# Patient Record
Sex: FEMALE | Race: OTHER | URBAN - METROPOLITAN AREA
[De-identification: names, ages, dates, MRNs, and addresses within clinical notes are randomized per-mention and may not be internally consistent; named-entity substitution may affect disease eponyms.]

---

## 2017-11-25 ENCOUNTER — EMERGENCY (EMERGENCY)
Facility: HOSPITAL | Age: 32
LOS: 1 days | Discharge: HOME | End: 2017-11-25
Attending: EMERGENCY MEDICINE | Admitting: EMERGENCY MEDICINE
Payer: COMMERCIAL

## 2017-11-25 VITALS
RESPIRATION RATE: 15 BRPM | WEIGHT: 119.93 LBS | HEART RATE: 97 BPM | HEIGHT: 62 IN | SYSTOLIC BLOOD PRESSURE: 122 MMHG | TEMPERATURE: 98 F | OXYGEN SATURATION: 98 % | DIASTOLIC BLOOD PRESSURE: 80 MMHG
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
## 2017-11-25 DIAGNOSIS — M79.672 PAIN IN LEFT FOOT: ICD-10-CM
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
## 2017-11-25 DIAGNOSIS — S99.922A UNSPECIFIED INJURY OF LEFT FOOT, INITIAL ENCOUNTER: ICD-10-CM

## 2017-11-25 PROCEDURE — 99284 EMERGENCY DEPT VISIT MOD MDM: CPT

## 2017-11-25 PROCEDURE — 99283 EMERGENCY DEPT VISIT LOW MDM: CPT | Mod: 25

## 2017-11-25 PROCEDURE — 73630 X-RAY EXAM OF FOOT: CPT

## 2017-11-25 PROCEDURE — 73630 X-RAY EXAM OF FOOT: CPT | Mod: 26,LT
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
## 2017-11-25 NOTE — ED PROVIDER NOTE - PROGRESS NOTE DETAILS
Discussed with Patient and/or Family regarding the X-ray findings.  Discussed the risks of occult / secondary fracture or ligamentous/tendon injury. Discussed crush inj prec / inst as well. Discussed the importance of close prompt follow-up with Orthopaedics for definitive workup and treatment.  Also discussed injury / neuro precautions.  Verbalization of understanding of all instructions was shown and an opportunity was given for questions.
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
## 2018-07-23 NOTE — ED PROVIDER NOTE - CHPI ED SYMPTOMS NEG
no numbness/no abrasion/no back pain/no tingling/no deformity/no fever/no difficulty bearing weight/no stiffness
Adequate: hears normal conversation without difficulty

## 2021-07-21 ENCOUNTER — EMERGENCY (EMERGENCY)
Facility: HOSPITAL | Age: 36
LOS: 1 days | Discharge: OTHER INSTITUTION | End: 2021-07-21
Attending: INTERNAL MEDICINE | Admitting: INTERNAL MEDICINE
Payer: MEDICAID

## 2021-07-21 VITALS
TEMPERATURE: 98 F | SYSTOLIC BLOOD PRESSURE: 128 MMHG | OXYGEN SATURATION: 98 % | DIASTOLIC BLOOD PRESSURE: 86 MMHG | RESPIRATION RATE: 16 BRPM | HEIGHT: 62 IN | HEART RATE: 81 BPM | WEIGHT: 134.92 LBS

## 2021-07-21 PROCEDURE — 99285 EMERGENCY DEPT VISIT HI MDM: CPT

## 2021-07-21 NOTE — ED PROVIDER NOTE - PATIENT PORTAL LINK FT
You can access the FollowMyHealth Patient Portal offered by [facility] by registering at the following website: [email or web address]. By joining [employer]’s FollowMyHealth portal, you will also be able to view your health information using other applications (apps) compatible with our system.

## 2021-07-21 NOTE — ED PROVIDER NOTE - OBJECTIVE STATEMENT
35 yr old female c/o LLQ and casey back pain radiating to the vaginal area x 2 weeks. Denies hematuria, urinary burning, fever, NVD.  back pain general 35 yr old female c/o left flank pain ,  back pain radiating to the low abdomen  x 2 weeks. Her pain is increased this evening. No fever, no chills, no urinary symptoms, no nausea, no vomiting, not toxic in appearance

## 2021-07-21 NOTE — ED PROVIDER NOTE - CLINICAL SUMMARY MEDICAL DECISION MAKING FREE TEXT BOX
left flank pain  CT left distal stone cluster U/A reporting  UTI  plan ....  Rx IVF IV Rocephin fredy-max analgesia, call out to Dr Biggs for disposition

## 2021-07-21 NOTE — ED ADULT TRIAGE NOTE - CHIEF COMPLAINT QUOTE
35 yr old female c/o LLQ and casey back pain radiating to the vaginal area x 2 weeks. Denies hematuria, urinary burning, fever, NVD.

## 2021-07-21 NOTE — ED PROVIDER NOTE - SIGNIFICANT NEGATIVE FINDINGS
no headache, no chest pain, no SOB, no palpitations, no n/v  no urinary symptoms,  no neuro changes.

## 2021-07-21 NOTE — ED PROVIDER NOTE - NSFOLLOWUPINSTRUCTIONS_ED_ALL_ED_FT
1) Follow-up with your Primary Medical Doctor or referred doctor. Call today / next business day for prompt follow-up.  2) Return to Emergency room for any worsening or persistent pain, weakness,  vomiting, diarrhea, unable to eat / drink, weak or dizzy, or any other concerning symptoms.   -If you have ANY FEVER, you must return to ED immediately  3) See attached instruction sheets for additional information, including information regarding signs and symptoms to look out for, reasons to seek immediate care and other important instructions.  4) Follow-up with Urology, see attached. Call ASAP for appointment.  5) Percocet as needed for pain, no driving / operating heavy machinery

## 2021-07-21 NOTE — ED PROVIDER NOTE - PROGRESS NOTE DETAILS
DW Dr Biggs  appears to be stone cluster with UTI. The patient is not experiencing fever, If the pain is controlled and she is not experiencing fever or chills, then may treat out patient. Recommend observing the patient in the ED. plan additional IVF and Flomax DW Dr Biggs  appears to be stone cluster with UTI. The patient is not experiencing fever, If the pain is controlled and she is not experiencing fever or chills, then may treat out patient. Recommend observing the patient in the ED. plan additional IVF and Flomax  Also if the lead stone passes than the of stones should pass as well.

## 2021-07-21 NOTE — ED PROVIDER NOTE - CROS ED GI ALL NEG
Discharge Instructions    Discharged to home by car with relative. Discharged via walking, hospital escort: Yes.  Special equipment needed: Not Applicable    Be sure to schedule a follow-up appointment with your primary care doctor or any specialists as instructed.     Discharge Plan:   Influenza Vaccine Indication: Not indicated: Previously immunized this influenza season and > 8 years of age    I understand that a diet low in cholesterol, fat, and sodium is recommended for good health. Unless I have been given specific instructions below for another diet, I accept this instruction as my diet prescription.   Other diet:     Special Instructions: None    · Is patient discharged on Warfarin / Coumadin?   Yes    You are receiving the drug warfarin. Please understand the importance of monitoring warfarin with scheduled PT/INR blood draws.  Follow-up with a call to your personal Doctor's office in 3 days to schedule a PT/INR. .    IMPORTANT: HOW TO USE THIS INFORMATION:  This is a summary and does NOT have all possible information about this product. This information does not assure that this product is safe, effective, or appropriate for you. This information is not individual medical advice and does not substitute for the advice of your health care professional. Always ask your health care professional for complete information about this product and your specific health needs.      WARFARIN - ORAL (WARF-uh-rin)      COMMON BRAND NAME(S): Coumadin      WARNING:  Warfarin can cause very serious (possibly fatal) bleeding. This is more likely to occur when you first start taking this medication or if you take too much warfarin. To decrease your risk for bleeding, your doctor or other health care provider will monitor you closely and check your lab results (INR test) to make sure you are not taking too much warfarin. Keep all medical and laboratory appointments. Tell your doctor right away if you notice any signs of  "serious bleeding. See also Side Effects section.      USES:  This medication is used to treat blood clots (such as in deep vein thrombosis-DVT or pulmonary embolus-PE) and/or to prevent new clots from forming in your body. Preventing harmful blood clots helps to reduce the risk of a stroke or heart attack. Conditions that increase your risk of developing blood clots include a certain type of irregular heart rhythm (atrial fibrillation), heart valve replacement, recent heart attack, and certain surgeries (such as hip/knee replacement). Warfarin is commonly called a \"blood thinner,\" but the more correct term is \"anticoagulant.\" It helps to keep blood flowing smoothly in your body by decreasing the amount of certain substances (clotting proteins) in your blood.      HOW TO USE:  Read the Medication Guide provided by your pharmacist before you start taking warfarin and each time you get a refill. If you have any questions, ask your doctor or pharmacist. Take this medication by mouth with or without food as directed by your doctor or other health care professional, usually once a day. It is very important to take it exactly as directed. Do not increase the dose, take it more frequently, or stop using it unless directed by your doctor. Dosage is based on your medical condition, laboratory tests (such as INR), and response to treatment. Your doctor or other health care provider will monitor you closely while you are taking this medication to determine the right dose for you. Use this medication regularly to get the most benefit from it. To help you remember, take it at the same time each day. It is important to eat a balanced, consistent diet while taking warfarin. Some foods can affect how warfarin works in your body and may affect your treatment and dose. Avoid sudden large increases or decreases in your intake of foods high in vitamin K (such as broccoli, cauliflower, cabbage, brussels sprouts, kale, spinach, and other " green leafy vegetables, liver, green tea, certain vitamin supplements). If you are trying to lose weight, check with your doctor before you try to go on a diet. Cranberry products may also affect how your warfarin works. Limit the amount of cranberry juice (16 ounces/480 milliliters a day) or other cranberry products you may drink or eat.      SIDE EFFECTS:  Nausea, loss of appetite, or stomach/abdominal pain may occur. If any of these effects persist or worsen, tell your doctor or pharmacist promptly. Remember that your doctor has prescribed this medication because he or she has judged that the benefit to you is greater than the risk of side effects. Many people using this medication do not have serious side effects. This medication can cause serious bleeding if it affects your blood clotting proteins too much (shown by unusually high INR lab results). Even if your doctor stops your medication, this risk of bleeding can continue for up to a week. Tell your doctor right away if you have any signs of serious bleeding, including: unusual pain/swelling/discomfort, unusual/easy bruising, prolonged bleeding from cuts or gums, persistent/frequent nosebleeds, unusually heavy/prolonged menstrual flow, pink/dark urine, coughing up blood, vomit that is bloody or looks like coffee grounds, severe headache, dizziness/fainting, unusual or persistent tiredness/weakness, bloody/black/tarry stools, chest pain, shortness of breath, difficulty swallowing. Tell your doctor right away if any of these unlikely but serious side effects occur: persistent nausea/vomiting, severe stomach/abdominal pain, yellowing eyes/skin. This drug rarely has caused very serious (possibly fatal) problems if its effects lead to small blood clots (usually at the beginning of treatment). This can lead to severe skin/tissue damage that may require surgery or amputation if left untreated. Patients with certain blood conditions (protein C or S deficiency) may  be at greater risk. Get medical help right away if any of these rare but serious side effects occur: painful/red/purplish patches on the skin (such as on the toe, breast, abdomen), change in the amount of urine, vision changes, confusion, slurred speech, weakness on one side of the body. A very serious allergic reaction to this drug is rare. However, get medical help right away if you notice any symptoms of a serious allergic reaction, including: rash, itching/swelling (especially of the face/tongue/throat), severe dizziness, trouble breathing. This is not a complete list of possible side effects. If you notice other effects not listed above, contact your doctor or pharmacist. In the [place] - Call your doctor for medical advice about side effects. You may report side effects to FDA at 9-530-AAY-1020. In [place] - Call your doctor for medical advice about side effects. You may report side effects to [facility] [place] at 1-523.391.9642.      PRECAUTIONS:  Before taking warfarin, tell your doctor or pharmacist if you are allergic to it; or if you have any other allergies. This product may contain inactive ingredients, which can cause allergic reactions or other problems. Talk to your pharmacist for more details. Before using this medication, tell your doctor or pharmacist your medical history, especially of: blood disorders (such as anemia, hemophilia), bleeding problems (such as bleeding of the stomach/intestines, bleeding in the brain), blood vessel disorders (such as aneurysms), recent major injury/surgery, liver disease, alcohol use, mental/mood disorders (including memory problems), frequent falls/injuries. It is important that all your doctors and dentists know that you take warfarin. Before having surgery or any medical/dental procedures, tell your doctor or dentist that you are taking this medication and about all the products you use (including prescription drugs, nonprescription drugs, and herbal products). Avoid  getting injections into the muscles. If you must have an injection into a muscle (for example, a flu shot), it should be given in the arm. This way, it will be easier to check for bleeding and/or apply pressure bandages. This medication may cause stomach bleeding. Daily use of alcohol while using this medicine will increase your risk for stomach bleeding and may also affect how this medication works. Limit or avoid alcoholic beverages. If you have not been eating well, if you have an illness or infection that causes fever, vomiting, or diarrhea for more than 2 days, or if you start using any antibiotic medications, contact your doctor or pharmacist immediately because these conditions can affect how warfarin works. This medication can cause heavy bleeding. To lower the chance of getting cut, bruised, or injured, use great caution with sharp objects like safety razors and nail cutters. Use an electric razor when shaving and a soft toothbrush when brushing your teeth. Avoid activities such as contact sports. If you fall or injure yourself, especially if you hit your head, call your doctor immediately. Your doctor may need to check you. The Food & Drug Administration has stated that generic warfarin products are interchangeable. However, consult your doctor or pharmacist before switching warfarin products. Be careful not to take more than one medication that contains warfarin unless specifically directed by the doctor or health care provider who is monitoring your warfarin treatment. Older adults may be at greater risk for bleeding while using this drug. This medication is not recommended for use during pregnancy because of serious (possibly fatal) harm to an unborn baby. Discuss the use of reliable forms of birth control with your doctor. If you become pregnant or think you may be pregnant, tell your doctor immediately. If you are planning pregnancy, discuss a plan for managing your condition with your doctor before  "you become pregnant. Your doctor may switch the type of medication you use during pregnancy. Very small amounts of this medication may pass into breast milk but is unlikely to harm a nursing infant. Consult your doctor before breast-feeding.      DRUG INTERACTIONS:  Drug interactions may change how your medications work or increase your risk for serious side effects. This document does not contain all possible drug interactions. Keep a list of all the products you use (including prescription/nonprescription drugs and herbal products) and share it with your doctor and pharmacist. Do not start, stop, or change the dosage of any medicines without your doctor's approval. Warfarin interacts with many prescription, nonprescription, vitamin, and herbal products. This includes medications that are applied to the skin or inside the vagina or rectum. The interactions with warfarin usually result in an increase or decrease in the \"blood-thinning\" (anticoagulant) effect. Your doctor or other health care professional should closely monitor you to prevent serious bleeding or clotting problems. While taking warfarin, it is very important to tell your doctor or pharmacist of any changes in medications, vitamins, or herbal products that you are taking. Some products that may interact with this drug include: capecitabine, imatinib, mifepristone. Aspirin, aspirin-like drugs (salicylates), and nonsteroidal anti-inflammatory drugs (NSAIDs such as ibuprofen, naproxen, celecoxib) may have effects similar to warfarin. These drugs may increase the risk of bleeding problems if taken during treatment with warfarin. Carefully check all prescription/nonprescription product labels (including drugs applied to the skin such as pain-relieving creams) since the products may contain NSAIDs or salicylates. Talk to your doctor about using a different medication (such as acetaminophen) to treat pain/fever. Low-dose aspirin and related drugs (such as " clopidogrel, ticlopidine) should be continued if prescribed by your doctor for specific medical reasons such as heart attack or stroke prevention. Consult your doctor or pharmacist for more details. Many herbal products interact with warfarin. Tell your doctor before taking any herbal products, especially bromelains, coenzyme Q10, cranberry, danshen, dong quai, fenugreek, garlic, ginkgo biloba, ginseng, and Harry's wort, among others. This medication may interfere with a certain laboratory test to measure theophylline levels, possibly causing false test results. Make sure laboratory personnel and all your doctors know you use this drug.      OVERDOSE:  If overdose is suspected, contact a poison control center or emergency room immediately. [place] residents can call the [place] National Poison Hotline at 1-774.318.7467. [place] residents can call a provincial poison control center. Symptoms of overdose may include: bloody/black/tarry stools, pink/dark urine, unusual/prolonged bleeding.      NOTES:  Do not share this medication with others. Laboratory and/or medical tests (such as INR, complete blood count) must be performed periodically to monitor your progress or check for side effects. Consult your doctor for more details.      MISSED DOSE:  For the best possible benefit, do not miss any doses. If you do miss a dose and remember on the same day, take it as soon as you remember. If you remember on the next day, skip the missed dose and resume your usual dosing schedule. Do not double the dose to catch up because this could increase your risk for bleeding. Keep a record of missed doses to give to your doctor or pharmacist. Contact your doctor or pharmacist if you miss 2 or more doses in a row.      STORAGE:  Store at room temperature away from light and moisture. Do not store in the bathroom. Keep all medications away from children and pets. Do not flush medications down the toilet or pour them into a drain unless  instructed to do so. Properly discard this product when it is  or no longer needed. Consult your pharmacist or local waste disposal company for more details about how to safely discard your product.      MEDICAL ALERT:  Your condition and medication can cause complications in a medical emergency. For information about enrolling in MedicAlert, call 1-956.416.5852 ([place]) or 1-246.468.5075 ([place]).      Information last revised 2010 Copyright(c)  [employer]             Depression / Suicide Risk    As you are discharged from this [facility] facility, it is important to learn how to keep safe from harming yourself.    Recognize the warning signs:  · Abrupt changes in personality, positive or negative- including increase in energy   · Giving away possessions  · Change in eating patterns- significant weight changes-  positive or negative  · Change in sleeping patterns- unable to sleep or sleeping all the time   · Unwillingness or inability to communicate  · Depression  · Unusual sadness, discouragement and loneliness  · Talk of wanting to die  · Neglect of personal appearance   · Rebelliousness- reckless behavior  · Withdrawal from people/activities they love  · Confusion- inability to concentrate     If you or a loved one observes any of these behaviors or has concerns about self-harm, here's what you can do:  · Talk about it- your feelings and reasons for harming yourself  · Remove any means that you might use to hurt yourself (examples: pills, rope, extension cords, firearm)  · Get professional help from the community (Mental Health, Substance Abuse, psychological counseling)  · Do not be alone:Call your Safe Contact- someone whom you trust who will be there for you.  · Call your local CRISIS HOTLINE 256-6509 or 541-266-9033  · Call your local Children's Mobile Crisis Response Team [place] (702) 899-3213 or www.[employer]  · Call the toll free National Suicide Prevention  Hotlines   · National Suicide Prevention Lifeline 236-125-EMDI (7350)  · National Hope Line Network 800-SUICIDE (102-3676)       - - -

## 2021-07-21 NOTE — ED PROVIDER NOTE - CARE PROVIDER_API CALL
Kaur Biggs)  Urology  [address] [place] [address], [address]  [place] [address]  Phone: [phone]  Fax: [phone]  Follow Up Time: Urgent

## 2021-07-22 VITALS
TEMPERATURE: 99 F | DIASTOLIC BLOOD PRESSURE: 78 MMHG | OXYGEN SATURATION: 100 % | SYSTOLIC BLOOD PRESSURE: 111 MMHG | RESPIRATION RATE: 16 BRPM | HEART RATE: 90 BPM

## 2021-07-22 LAB
ALBUMIN SERPL ELPH-MCNC: 3.6 G/DL — SIGNIFICANT CHANGE UP (ref 3.3–5)
ALP SERPL-CCNC: 54 U/L — SIGNIFICANT CHANGE UP (ref 40–120)
ALT FLD-CCNC: 15 U/L — SIGNIFICANT CHANGE UP (ref 12–78)
ANION GAP SERPL CALC-SCNC: 6 MMOL/L — SIGNIFICANT CHANGE UP (ref 5–17)
APPEARANCE UR: ABNORMAL
APTT BLD: 31.3 SEC — SIGNIFICANT CHANGE UP (ref 27.5–35.5)
AST SERPL-CCNC: 14 U/L — LOW (ref 15–37)
BACTERIA # UR AUTO: ABNORMAL
BILIRUB SERPL-MCNC: 0.4 MG/DL — SIGNIFICANT CHANGE UP (ref 0.2–1.2)
BILIRUB UR-MCNC: NEGATIVE — SIGNIFICANT CHANGE UP
BLD GP AB SCN SERPL QL: SIGNIFICANT CHANGE UP
BUN SERPL-MCNC: 8 MG/DL — SIGNIFICANT CHANGE UP (ref 7–23)
CALCIUM SERPL-MCNC: 8.8 MG/DL — SIGNIFICANT CHANGE UP (ref 8.5–10.1)
CHLORIDE SERPL-SCNC: 111 MMOL/L — HIGH (ref 96–108)
CO2 SERPL-SCNC: 25 MMOL/L — SIGNIFICANT CHANGE UP (ref 22–31)
COLOR SPEC: YELLOW — SIGNIFICANT CHANGE UP
CREAT SERPL-MCNC: 0.69 MG/DL — SIGNIFICANT CHANGE UP (ref 0.5–1.3)
DIFF PNL FLD: ABNORMAL
EPI CELLS # UR: SIGNIFICANT CHANGE UP
GLUCOSE SERPL-MCNC: 99 MG/DL — SIGNIFICANT CHANGE UP (ref 70–99)
GLUCOSE UR QL: NEGATIVE — SIGNIFICANT CHANGE UP
HCG SERPL-ACNC: <1 MIU/ML — SIGNIFICANT CHANGE UP
HCT VFR BLD CALC: 39.8 % — SIGNIFICANT CHANGE UP (ref 34.5–45)
HGB BLD-MCNC: 13.2 G/DL — SIGNIFICANT CHANGE UP (ref 11.5–15.5)
INR BLD: 1.13 RATIO — SIGNIFICANT CHANGE UP (ref 0.88–1.16)
KETONES UR-MCNC: NEGATIVE — SIGNIFICANT CHANGE UP
LEUKOCYTE ESTERASE UR-ACNC: ABNORMAL
MCHC RBC-ENTMCNC: 29 PG — SIGNIFICANT CHANGE UP (ref 27–34)
MCHC RBC-ENTMCNC: 33.2 GM/DL — SIGNIFICANT CHANGE UP (ref 32–36)
MCV RBC AUTO: 87.5 FL — SIGNIFICANT CHANGE UP (ref 80–100)
NITRITE UR-MCNC: POSITIVE
NRBC # BLD: 0 /100 WBCS — SIGNIFICANT CHANGE UP (ref 0–0)
PH UR: 5 — SIGNIFICANT CHANGE UP (ref 5–8)
PLATELET # BLD AUTO: 235 K/UL — SIGNIFICANT CHANGE UP (ref 150–400)
POTASSIUM SERPL-MCNC: 3.8 MMOL/L — SIGNIFICANT CHANGE UP (ref 3.5–5.3)
POTASSIUM SERPL-SCNC: 3.8 MMOL/L — SIGNIFICANT CHANGE UP (ref 3.5–5.3)
PROT SERPL-MCNC: 7.9 G/DL — SIGNIFICANT CHANGE UP (ref 6–8.3)
PROT UR-MCNC: 100
PROTHROM AB SERPL-ACNC: 13.1 SEC — SIGNIFICANT CHANGE UP (ref 10.6–13.6)
RBC # BLD: 4.55 M/UL — SIGNIFICANT CHANGE UP (ref 3.8–5.2)
RBC # FLD: 13.4 % — SIGNIFICANT CHANGE UP (ref 10.3–14.5)
RBC CASTS # UR COMP ASSIST: ABNORMAL /HPF (ref 0–4)
SARS-COV-2 RNA SPEC QL NAA+PROBE: SIGNIFICANT CHANGE UP
SODIUM SERPL-SCNC: 142 MMOL/L — SIGNIFICANT CHANGE UP (ref 135–145)
SP GR SPEC: 1.02 — SIGNIFICANT CHANGE UP (ref 1.01–1.02)
UROBILINOGEN FLD QL: NEGATIVE — SIGNIFICANT CHANGE UP
WBC # BLD: 12.31 K/UL — HIGH (ref 3.8–10.5)
WBC # FLD AUTO: 12.31 K/UL — HIGH (ref 3.8–10.5)
WBC UR QL: >50

## 2021-07-22 PROCEDURE — 36415 COLL VENOUS BLD VENIPUNCTURE: CPT

## 2021-07-22 PROCEDURE — 80053 COMPREHEN METABOLIC PANEL: CPT

## 2021-07-22 PROCEDURE — 85610 PROTHROMBIN TIME: CPT

## 2021-07-22 PROCEDURE — 96374 THER/PROPH/DIAG INJ IV PUSH: CPT

## 2021-07-22 PROCEDURE — 99284 EMERGENCY DEPT VISIT MOD MDM: CPT | Mod: 25

## 2021-07-22 PROCEDURE — 96376 TX/PRO/DX INJ SAME DRUG ADON: CPT

## 2021-07-22 PROCEDURE — 96361 HYDRATE IV INFUSION ADD-ON: CPT

## 2021-07-22 PROCEDURE — 87635 SARS-COV-2 COVID-19 AMP PRB: CPT

## 2021-07-22 PROCEDURE — 86850 RBC ANTIBODY SCREEN: CPT

## 2021-07-22 PROCEDURE — 74176 CT ABD & PELVIS W/O CONTRAST: CPT | Mod: 26,MA

## 2021-07-22 PROCEDURE — 85730 THROMBOPLASTIN TIME PARTIAL: CPT

## 2021-07-22 PROCEDURE — 96375 TX/PRO/DX INJ NEW DRUG ADDON: CPT

## 2021-07-22 PROCEDURE — 85027 COMPLETE CBC AUTOMATED: CPT

## 2021-07-22 PROCEDURE — 84702 CHORIONIC GONADOTROPIN TEST: CPT

## 2021-07-22 PROCEDURE — 81001 URINALYSIS AUTO W/SCOPE: CPT

## 2021-07-22 PROCEDURE — 86900 BLOOD TYPING SEROLOGIC ABO: CPT

## 2021-07-22 PROCEDURE — 86901 BLOOD TYPING SEROLOGIC RH(D): CPT

## 2021-07-22 PROCEDURE — 74176 CT ABD & PELVIS W/O CONTRAST: CPT | Mod: MA

## 2021-07-22 RX ORDER — ONDANSETRON 8 MG/1
4 TABLET, FILM COATED ORAL ONCE
Refills: 0 | Status: COMPLETED | OUTPATIENT
Start: 2021-07-22 | End: 2021-07-22

## 2021-07-22 RX ORDER — TAMSULOSIN HYDROCHLORIDE 0.4 MG/1
0.4 CAPSULE ORAL ONCE
Refills: 0 | Status: COMPLETED | OUTPATIENT
Start: 2021-07-22 | End: 2021-07-22

## 2021-07-22 RX ORDER — CEFUROXIME AXETIL 250 MG
1 TABLET ORAL
Qty: 20 | Refills: 0
Start: 2021-07-22 | End: 2021-07-31

## 2021-07-22 RX ORDER — TAMSULOSIN HYDROCHLORIDE 0.4 MG/1
1 CAPSULE ORAL
Qty: 30 | Refills: 0
Start: 2021-07-22 | End: 2021-08-20

## 2021-07-22 RX ORDER — SODIUM CHLORIDE 9 MG/ML
1000 INJECTION INTRAMUSCULAR; INTRAVENOUS; SUBCUTANEOUS ONCE
Refills: 0 | Status: COMPLETED | OUTPATIENT
Start: 2021-07-22 | End: 2021-07-22

## 2021-07-22 RX ORDER — OXYCODONE AND ACETAMINOPHEN 5; 325 MG/1; MG/1
1 TABLET ORAL
Qty: 12 | Refills: 0
Start: 2021-07-22 | End: 2021-07-24

## 2021-07-22 RX ORDER — MORPHINE SULFATE 50 MG/1
4 CAPSULE, EXTENDED RELEASE ORAL ONCE
Refills: 0 | Status: DISCONTINUED | OUTPATIENT
Start: 2021-07-22 | End: 2021-07-22

## 2021-07-22 RX ORDER — CEFTRIAXONE 500 MG/1
1000 INJECTION, POWDER, FOR SOLUTION INTRAMUSCULAR; INTRAVENOUS ONCE
Refills: 0 | Status: COMPLETED | OUTPATIENT
Start: 2021-07-22 | End: 2021-07-22

## 2021-07-22 RX ADMIN — CEFTRIAXONE 1000 MILLIGRAM(S): 500 INJECTION, POWDER, FOR SOLUTION INTRAMUSCULAR; INTRAVENOUS at 02:31

## 2021-07-22 RX ADMIN — TAMSULOSIN HYDROCHLORIDE 0.4 MILLIGRAM(S): 0.4 CAPSULE ORAL at 05:40

## 2021-07-22 RX ADMIN — CEFTRIAXONE 100 MILLIGRAM(S): 500 INJECTION, POWDER, FOR SOLUTION INTRAMUSCULAR; INTRAVENOUS at 02:01

## 2021-07-22 RX ADMIN — MORPHINE SULFATE 4 MILLIGRAM(S): 50 CAPSULE, EXTENDED RELEASE ORAL at 02:39

## 2021-07-22 RX ADMIN — SODIUM CHLORIDE 1000 MILLILITER(S): 9 INJECTION INTRAMUSCULAR; INTRAVENOUS; SUBCUTANEOUS at 01:31

## 2021-07-22 RX ADMIN — SODIUM CHLORIDE 1000 MILLILITER(S): 9 INJECTION INTRAMUSCULAR; INTRAVENOUS; SUBCUTANEOUS at 05:40

## 2021-07-22 RX ADMIN — MORPHINE SULFATE 4 MILLIGRAM(S): 50 CAPSULE, EXTENDED RELEASE ORAL at 02:54

## 2021-07-22 RX ADMIN — MORPHINE SULFATE 4 MILLIGRAM(S): 50 CAPSULE, EXTENDED RELEASE ORAL at 06:28

## 2021-07-22 RX ADMIN — ONDANSETRON 4 MILLIGRAM(S): 8 TABLET, FILM COATED ORAL at 02:39

## 2021-07-22 RX ADMIN — SODIUM CHLORIDE 1000 MILLILITER(S): 9 INJECTION INTRAMUSCULAR; INTRAVENOUS; SUBCUTANEOUS at 00:31

## 2021-07-22 RX ADMIN — MORPHINE SULFATE 4 MILLIGRAM(S): 50 CAPSULE, EXTENDED RELEASE ORAL at 06:44

## 2021-07-22 RX ADMIN — SODIUM CHLORIDE 1000 MILLILITER(S): 9 INJECTION INTRAMUSCULAR; INTRAVENOUS; SUBCUTANEOUS at 06:40

## 2021-07-22 NOTE — ED ADULT NURSE NOTE - OBJECTIVE STATEMENT
Patient A/Ox4 with a steady gait. C/o left flank pain that radiates to her LLQ since yesterday. Says the pain is "sore" in nature and is 5/10. Was worse at home but she took Advil PTA. Patient also endorses dysuria and frequency. Denies fever, nausea or PMHx.

## 2021-07-22 NOTE — ED ADULT NURSE REASSESSMENT NOTE - NS ED NURSE REASSESS COMMENT FT1
Patient reports relief of LLQ and left flank "squeezing/pulling" pain with morphine. Also endorses a better stream of urine. Will continue to monitor.

## 2021-08-20 ENCOUNTER — EMERGENCY (EMERGENCY)
Facility: HOSPITAL | Age: 36
LOS: 1 days | Discharge: HOME | End: 2021-08-20
Attending: EMERGENCY MEDICINE | Admitting: EMERGENCY MEDICINE
Payer: MEDICAID

## 2021-08-20 VITALS
HEART RATE: 93 BPM | OXYGEN SATURATION: 97 % | DIASTOLIC BLOOD PRESSURE: 72 MMHG | TEMPERATURE: 98 F | RESPIRATION RATE: 16 BRPM | SYSTOLIC BLOOD PRESSURE: 132 MMHG

## 2021-08-20 VITALS
TEMPERATURE: 98 F | SYSTOLIC BLOOD PRESSURE: 133 MMHG | DIASTOLIC BLOOD PRESSURE: 84 MMHG | HEART RATE: 97 BPM | HEIGHT: 62 IN | RESPIRATION RATE: 14 BRPM | OXYGEN SATURATION: 98 %

## 2021-08-20 LAB
ALBUMIN SERPL ELPH-MCNC: 3.2 G/DL — LOW (ref 3.3–5)
ALP SERPL-CCNC: 46 U/L — SIGNIFICANT CHANGE UP (ref 40–120)
ALT FLD-CCNC: 16 U/L — SIGNIFICANT CHANGE UP (ref 12–78)
ANION GAP SERPL CALC-SCNC: 5 MMOL/L — SIGNIFICANT CHANGE UP (ref 5–17)
APPEARANCE UR: ABNORMAL
AST SERPL-CCNC: 13 U/L — LOW (ref 15–37)
BASOPHILS # BLD AUTO: 0.02 K/UL — SIGNIFICANT CHANGE UP (ref 0–0.2)
BASOPHILS NFR BLD AUTO: 0.2 % — SIGNIFICANT CHANGE UP (ref 0–2)
BILIRUB SERPL-MCNC: 0.3 MG/DL — SIGNIFICANT CHANGE UP (ref 0.2–1.2)
BILIRUB UR-MCNC: NEGATIVE — SIGNIFICANT CHANGE UP
BUN SERPL-MCNC: 11 MG/DL — SIGNIFICANT CHANGE UP (ref 7–23)
CALCIUM SERPL-MCNC: 8.8 MG/DL — SIGNIFICANT CHANGE UP (ref 8.5–10.1)
CHLORIDE SERPL-SCNC: 111 MMOL/L — HIGH (ref 96–108)
CO2 SERPL-SCNC: 28 MMOL/L — SIGNIFICANT CHANGE UP (ref 22–31)
COLOR SPEC: YELLOW — SIGNIFICANT CHANGE UP
CREAT SERPL-MCNC: 0.67 MG/DL — SIGNIFICANT CHANGE UP (ref 0.5–1.3)
DIFF PNL FLD: ABNORMAL
EOSINOPHIL # BLD AUTO: 0.07 K/UL — SIGNIFICANT CHANGE UP (ref 0–0.5)
EOSINOPHIL NFR BLD AUTO: 0.7 % — SIGNIFICANT CHANGE UP (ref 0–6)
GLUCOSE SERPL-MCNC: 88 MG/DL — SIGNIFICANT CHANGE UP (ref 70–99)
GLUCOSE UR QL: NEGATIVE — SIGNIFICANT CHANGE UP
HCG SERPL-ACNC: <1 MIU/ML — SIGNIFICANT CHANGE UP
HCT VFR BLD CALC: 35.6 % — SIGNIFICANT CHANGE UP (ref 34.5–45)
HGB BLD-MCNC: 12.1 G/DL — SIGNIFICANT CHANGE UP (ref 11.5–15.5)
IMM GRANULOCYTES NFR BLD AUTO: 0.4 % — SIGNIFICANT CHANGE UP (ref 0–1.5)
KETONES UR-MCNC: ABNORMAL
LEUKOCYTE ESTERASE UR-ACNC: ABNORMAL
LYMPHOCYTES # BLD AUTO: 2.21 K/UL — SIGNIFICANT CHANGE UP (ref 1–3.3)
LYMPHOCYTES # BLD AUTO: 23.3 % — SIGNIFICANT CHANGE UP (ref 13–44)
MCHC RBC-ENTMCNC: 29.4 PG — SIGNIFICANT CHANGE UP (ref 27–34)
MCHC RBC-ENTMCNC: 34 GM/DL — SIGNIFICANT CHANGE UP (ref 32–36)
MCV RBC AUTO: 86.4 FL — SIGNIFICANT CHANGE UP (ref 80–100)
MONOCYTES # BLD AUTO: 0.97 K/UL — HIGH (ref 0–0.9)
MONOCYTES NFR BLD AUTO: 10.2 % — SIGNIFICANT CHANGE UP (ref 2–14)
NEUTROPHILS # BLD AUTO: 6.18 K/UL — SIGNIFICANT CHANGE UP (ref 1.8–7.4)
NEUTROPHILS NFR BLD AUTO: 65.2 % — SIGNIFICANT CHANGE UP (ref 43–77)
NITRITE UR-MCNC: NEGATIVE — SIGNIFICANT CHANGE UP
NRBC # BLD: 0 /100 WBCS — SIGNIFICANT CHANGE UP (ref 0–0)
PH UR: 5 — SIGNIFICANT CHANGE UP (ref 5–8)
PLATELET # BLD AUTO: 217 K/UL — SIGNIFICANT CHANGE UP (ref 150–400)
POTASSIUM SERPL-MCNC: 3.2 MMOL/L — LOW (ref 3.5–5.3)
POTASSIUM SERPL-SCNC: 3.2 MMOL/L — LOW (ref 3.5–5.3)
PROT SERPL-MCNC: 7.7 G/DL — SIGNIFICANT CHANGE UP (ref 6–8.3)
PROT UR-MCNC: 30 MG/DL
RBC # BLD: 4.12 M/UL — SIGNIFICANT CHANGE UP (ref 3.8–5.2)
RBC # FLD: 13.1 % — SIGNIFICANT CHANGE UP (ref 10.3–14.5)
SODIUM SERPL-SCNC: 144 MMOL/L — SIGNIFICANT CHANGE UP (ref 135–145)
SP GR SPEC: 1.02 — SIGNIFICANT CHANGE UP (ref 1.01–1.02)
UROBILINOGEN FLD QL: NEGATIVE — SIGNIFICANT CHANGE UP
WBC # BLD: 9.49 K/UL — SIGNIFICANT CHANGE UP (ref 3.8–10.5)
WBC # FLD AUTO: 9.49 K/UL — SIGNIFICANT CHANGE UP (ref 3.8–10.5)

## 2021-08-20 PROCEDURE — 99284 EMERGENCY DEPT VISIT MOD MDM: CPT | Mod: 25

## 2021-08-20 PROCEDURE — 96374 THER/PROPH/DIAG INJ IV PUSH: CPT

## 2021-08-20 PROCEDURE — 80053 COMPREHEN METABOLIC PANEL: CPT

## 2021-08-20 PROCEDURE — 36415 COLL VENOUS BLD VENIPUNCTURE: CPT

## 2021-08-20 PROCEDURE — 81001 URINALYSIS AUTO W/SCOPE: CPT

## 2021-08-20 PROCEDURE — 99284 EMERGENCY DEPT VISIT MOD MDM: CPT

## 2021-08-20 PROCEDURE — 84702 CHORIONIC GONADOTROPIN TEST: CPT

## 2021-08-20 PROCEDURE — 74176 CT ABD & PELVIS W/O CONTRAST: CPT | Mod: 26,ME

## 2021-08-20 PROCEDURE — 85025 COMPLETE CBC W/AUTO DIFF WBC: CPT

## 2021-08-20 PROCEDURE — 96375 TX/PRO/DX INJ NEW DRUG ADDON: CPT

## 2021-08-20 PROCEDURE — 87086 URINE CULTURE/COLONY COUNT: CPT

## 2021-08-20 PROCEDURE — 74176 CT ABD & PELVIS W/O CONTRAST: CPT | Mod: ME

## 2021-08-20 PROCEDURE — G1004: CPT

## 2021-08-20 RX ORDER — POTASSIUM CHLORIDE 20 MEQ
40 PACKET (EA) ORAL ONCE
Refills: 0 | Status: COMPLETED | OUTPATIENT
Start: 2021-08-20 | End: 2021-08-20

## 2021-08-20 RX ORDER — TAMSULOSIN HYDROCHLORIDE 0.4 MG/1
0.4 CAPSULE ORAL ONCE
Refills: 0 | Status: COMPLETED | OUTPATIENT
Start: 2021-08-20 | End: 2021-08-20

## 2021-08-20 RX ORDER — CEFTRIAXONE 500 MG/1
1000 INJECTION, POWDER, FOR SOLUTION INTRAMUSCULAR; INTRAVENOUS ONCE
Refills: 0 | Status: COMPLETED | OUTPATIENT
Start: 2021-08-20 | End: 2021-08-20

## 2021-08-20 RX ORDER — TAMSULOSIN HYDROCHLORIDE 0.4 MG/1
1 CAPSULE ORAL
Qty: 7 | Refills: 0
Start: 2021-08-20 | End: 2021-08-26

## 2021-08-20 RX ORDER — KETOROLAC TROMETHAMINE 30 MG/ML
15 SYRINGE (ML) INJECTION ONCE
Refills: 0 | Status: DISCONTINUED | OUTPATIENT
Start: 2021-08-20 | End: 2021-08-20

## 2021-08-20 RX ORDER — CEFUROXIME AXETIL 250 MG
1 TABLET ORAL
Qty: 14 | Refills: 0
Start: 2021-08-20 | End: 2021-08-26

## 2021-08-20 RX ORDER — OXYCODONE HYDROCHLORIDE 5 MG/1
1 TABLET ORAL
Qty: 16 | Refills: 0
Start: 2021-08-20 | End: 2021-08-23

## 2021-08-20 RX ADMIN — Medication 15 MILLIGRAM(S): at 20:31

## 2021-08-20 RX ADMIN — Medication 40 MILLIEQUIVALENT(S): at 23:43

## 2021-08-20 RX ADMIN — Medication 15 MILLIGRAM(S): at 20:01

## 2021-08-20 RX ADMIN — TAMSULOSIN HYDROCHLORIDE 0.4 MILLIGRAM(S): 0.4 CAPSULE ORAL at 23:42

## 2021-08-20 RX ADMIN — CEFTRIAXONE 1000 MILLIGRAM(S): 500 INJECTION, POWDER, FOR SOLUTION INTRAMUSCULAR; INTRAVENOUS at 00:21

## 2021-08-20 RX ADMIN — CEFTRIAXONE 100 MILLIGRAM(S): 500 INJECTION, POWDER, FOR SOLUTION INTRAMUSCULAR; INTRAVENOUS at 23:21

## 2021-08-20 NOTE — ED PROVIDER NOTE - CARE PROVIDER_API CALL
Kaur Biggs)  Urology  [address] [place] [address], [address]  [place] [address]  Phone: [phone]  Fax: [phone]  Follow Up Time:

## 2021-08-20 NOTE — ED PROVIDER NOTE - OBJECTIVE STATEMENT
34 yo F PMHx renal colic presents to ED c/o worsening b/l flank pain L>R x a few days. Here 1 month ago with cluster L distal ureter stones, states pain never went away completely. Denies fever/chills, dysuria, hematuria, N/V.

## 2021-08-20 NOTE — ED ADULT NURSE NOTE - NSICDXNOFAMILYHX_GEN_ALL_CORE
Junctional beats on CPAP appears to have resolved.   -No plans for PPM at this time <-- Click to add NO pertinent Family History

## 2021-08-20 NOTE — ED PROVIDER NOTE - ATTENDING CONTRIBUTION TO CARE
34 yo F p/w has hx recent L sided ureteral colic - is being followed as outpt . Pt known to have mult stones in dist ureter. Now pt with some recent increased pain. Mostly on L back / flank, occ slight rad of pain to R flank. no fever/chills. no acute urinary sx. no neck  pain. no weakness / dizziness. no cough/uri. no known covid exposures. Pt not yet vaccinated for covid. no exposures. no agg/allev factors. NO malaise / achiness. no other acute co.  exam: MM Moist. neck supple. no meningeal signs. abd soft NT. no hsm. no cvat. min L CVAT. No R CVAT. Nl resp effort . cta bl, no w/r/r. NO acc muscle use. no c/c/e. Nl non-focal neuro exam. nl card exam. no other acute findings.  check labs, UA, CT, IVF, abx, urology fu

## 2021-08-20 NOTE — ED PROVIDER NOTE - PROGRESS NOTE DETAILS
received s/o from dr. lawler, labs and imaging explained to patient, VSS, no fever, will f/u with her urologist Dr. Biggs

## 2021-08-20 NOTE — ED PROVIDER NOTE - GASTROINTESTINAL NEGATIVE STATEMENT, MLM
+FLANK PAIN no abdominal pain, no bloating, no constipation, no diarrhea, no nausea and no vomiting.

## 2021-08-20 NOTE — ED PROVIDER NOTE - CLINICAL SUMMARY MEDICAL DECISION MAKING FREE TEXT BOX
34 yo F hx renal calculi p/w bilateral flank pain, L>R worsening over days, here ~1 month ago with same, had cluster left distal ureter stones -- will check labs, UA, CT, likely renal colic vs UTI/pyelo

## 2021-08-20 NOTE — ED ADULT NURSE REASSESSMENT NOTE - NS ED NURSE REASSESS COMMENT FT1
Assumed care of Pt from previous RN, c/o lower back pain, awaiting Ct result, currently resting comfortably on stretcher, will continue to monitor closely.

## 2021-08-20 NOTE — ED PROVIDER NOTE - NSFOLLOWUPINSTRUCTIONS_ED_ALL_ED_FT
1) Follow-up with your urologist Dr. Biggs. Call today / next business day for prompt follow-up.  2) Return to Emergency room for any worsening or persistent pain, weakness, fever, or any other concerning symptoms.  3) See attached instruction sheets for additional information, including information regarding signs and symptoms to look out for, reasons to seek immediate care and other important instructions.  4) Follow-up with any specialists as discussed / noted as well.   5) Ceftin 500mg twice a day for 7 days  6) Percocet 5mg every 6 hours as needed for pain.  Do not drive  7) flomax 0.4mg once a day for 7 days      WHAT YOU NEED TO KNOW:    Renal colic is severe pain in your lower back or sides. The pain is usually on one side, but may be on both sides of your lower back. Renal colic may start quickly, come and go, and become worse over time. Renal colic is caused by a blockage in your urinary tract. The most common cause of a blockage is a kidney stone. Blood clots, ureter spasms, and dead tissue may also block your urinary tract.    Female Urinary System                DISCHARGE INSTRUCTIONS:    Return to the emergency department if:   •You cannot stop vomiting.      •You see new or increased bleeding when you urinate.      •You are urinating less than usual, or not at all.      •Your pain is not getting better even after treatment.      Call your doctor if:   •You have fever.      •You need to urinate more often than usual, or right away.      •You see a stone in your urine strainer after you urinate.      •You have questions or concerns about your condition or care.      Medicines:   •Medicines may help decrease pain and muscle spasms. You may also need medicine to calm your stomach and stop vomiting.      •Take your medicine as directed. Contact your healthcare provider if you think your medicine is not helping or if you have side effects. Tell him of her if you are allergic to any medicine. Keep a list of the medicines, vitamins, and herbs you take. Include the amounts, and when and why you take them. Bring the list or the pill bottles to follow-up visits. Carry your medicine list with you in case of an emergency.      Manage your symptoms:   •Drink liquids as directed. This will help decrease pain and flush blockages from your urinary tract. Ask how much liquid to drink each day and which liquids are best for you. You may need to drink about 3 liters (12 glasses) of liquids each day. Half of your total daily liquids should be water. Limit coffee, tea, and soda to 2 cups daily. Your urine should be pale and clear.      •Strain your urine every time you urinate. Urinate into a strainer (funnel with a fine mesh on the bottom) or glass jar to collect kidney stones. Give the kidney stones to your healthcare provider at your next visit.  Look for Stones in the Filter           •Eat a variety of healthy foods. Healthy foods include fruits, vegetables, whole-grain breads, low-fat dairy products, beans, lean meats, and fish. You may need to increase the amount of citrus fruit you eat, such as oranges. Ask your healthcare provider how much salt, calcium, and protein you should eat.  Healthy Foods           •Avoid activity in hot temperatures. Heat may cause you to become dehydrated and urinate less.      Follow up with your doctor as directed: You may need to return for tests to check if your blockage has cleared. Write down your questions so you remember to ask them during your visits.

## 2021-08-20 NOTE — ED PROVIDER NOTE - PATIENT PORTAL LINK FT
You can access the FollowMyHealth Patient Portal offered by [facility] by registering at the following website: [email or web address]. By joining [employer]’s FollowMyHealth portal, you will also be able to view your health information using other applications (apps) compatible with our system.

## 2021-08-21 ENCOUNTER — TRANSCRIPTION ENCOUNTER (OUTPATIENT)
Age: 36
End: 2021-08-21

## 2021-08-22 LAB
CULTURE RESULTS: SIGNIFICANT CHANGE UP
SPECIMEN SOURCE: SIGNIFICANT CHANGE UP

## 2021-12-01 ENCOUNTER — EMERGENCY (EMERGENCY)
Facility: HOSPITAL | Age: 36
LOS: 1 days | Discharge: HOME | End: 2021-12-01
Attending: EMERGENCY MEDICINE | Admitting: EMERGENCY MEDICINE
Payer: MEDICAID

## 2021-12-01 VITALS
OXYGEN SATURATION: 100 % | DIASTOLIC BLOOD PRESSURE: 62 MMHG | TEMPERATURE: 98 F | HEART RATE: 83 BPM | RESPIRATION RATE: 16 BRPM | SYSTOLIC BLOOD PRESSURE: 108 MMHG

## 2021-12-01 VITALS
WEIGHT: 134.92 LBS | HEIGHT: 62 IN | TEMPERATURE: 98 F | HEART RATE: 86 BPM | OXYGEN SATURATION: 99 % | RESPIRATION RATE: 18 BRPM | SYSTOLIC BLOOD PRESSURE: 117 MMHG | DIASTOLIC BLOOD PRESSURE: 82 MMHG

## 2021-12-01 LAB
ALBUMIN SERPL ELPH-MCNC: 3.4 G/DL — SIGNIFICANT CHANGE UP (ref 3.3–5)
ALP SERPL-CCNC: 46 U/L — SIGNIFICANT CHANGE UP (ref 40–120)
ALT FLD-CCNC: 18 U/L — SIGNIFICANT CHANGE UP (ref 12–78)
ANION GAP SERPL CALC-SCNC: 6 MMOL/L — SIGNIFICANT CHANGE UP (ref 5–17)
APPEARANCE UR: ABNORMAL
AST SERPL-CCNC: 18 U/L — SIGNIFICANT CHANGE UP (ref 15–37)
BASOPHILS # BLD AUTO: 0.02 K/UL — SIGNIFICANT CHANGE UP (ref 0–0.2)
BASOPHILS NFR BLD AUTO: 0.2 % — SIGNIFICANT CHANGE UP (ref 0–2)
BILIRUB SERPL-MCNC: 0.6 MG/DL — SIGNIFICANT CHANGE UP (ref 0.2–1.2)
BILIRUB UR-MCNC: NEGATIVE — SIGNIFICANT CHANGE UP
BUN SERPL-MCNC: 7 MG/DL — SIGNIFICANT CHANGE UP (ref 7–23)
CALCIUM SERPL-MCNC: 8.9 MG/DL — SIGNIFICANT CHANGE UP (ref 8.5–10.1)
CHLORIDE SERPL-SCNC: 111 MMOL/L — HIGH (ref 96–108)
CO2 SERPL-SCNC: 24 MMOL/L — SIGNIFICANT CHANGE UP (ref 22–31)
COLOR SPEC: YELLOW — SIGNIFICANT CHANGE UP
CREAT SERPL-MCNC: 0.82 MG/DL — SIGNIFICANT CHANGE UP (ref 0.5–1.3)
DIFF PNL FLD: ABNORMAL
EOSINOPHIL # BLD AUTO: 0.03 K/UL — SIGNIFICANT CHANGE UP (ref 0–0.5)
EOSINOPHIL NFR BLD AUTO: 0.2 % — SIGNIFICANT CHANGE UP (ref 0–6)
GLUCOSE SERPL-MCNC: 76 MG/DL — SIGNIFICANT CHANGE UP (ref 70–99)
GLUCOSE UR QL: NEGATIVE — SIGNIFICANT CHANGE UP
HCT VFR BLD CALC: 37.1 % — SIGNIFICANT CHANGE UP (ref 34.5–45)
HGB BLD-MCNC: 12.3 G/DL — SIGNIFICANT CHANGE UP (ref 11.5–15.5)
HIV 1+2 AB+HIV1 P24 AG SERPL QL IA: SIGNIFICANT CHANGE UP
IMM GRANULOCYTES NFR BLD AUTO: 0.5 % — SIGNIFICANT CHANGE UP (ref 0–1.5)
KETONES UR-MCNC: NEGATIVE — SIGNIFICANT CHANGE UP
LEUKOCYTE ESTERASE UR-ACNC: ABNORMAL
LIDOCAIN IGE QN: 103 U/L — SIGNIFICANT CHANGE UP (ref 73–393)
LYMPHOCYTES # BLD AUTO: 16.7 % — SIGNIFICANT CHANGE UP (ref 13–44)
LYMPHOCYTES # BLD AUTO: 2.14 K/UL — SIGNIFICANT CHANGE UP (ref 1–3.3)
MCHC RBC-ENTMCNC: 28.6 PG — SIGNIFICANT CHANGE UP (ref 27–34)
MCHC RBC-ENTMCNC: 33.2 GM/DL — SIGNIFICANT CHANGE UP (ref 32–36)
MCV RBC AUTO: 86.3 FL — SIGNIFICANT CHANGE UP (ref 80–100)
MONOCYTES # BLD AUTO: 1.05 K/UL — HIGH (ref 0–0.9)
MONOCYTES NFR BLD AUTO: 8.2 % — SIGNIFICANT CHANGE UP (ref 2–14)
NEUTROPHILS # BLD AUTO: 9.48 K/UL — HIGH (ref 1.8–7.4)
NEUTROPHILS NFR BLD AUTO: 74.2 % — SIGNIFICANT CHANGE UP (ref 43–77)
NITRITE UR-MCNC: POSITIVE
NRBC # BLD: 0 /100 WBCS — SIGNIFICANT CHANGE UP (ref 0–0)
PH UR: 6 — SIGNIFICANT CHANGE UP (ref 5–8)
PLATELET # BLD AUTO: 199 K/UL — SIGNIFICANT CHANGE UP (ref 150–400)
POTASSIUM SERPL-MCNC: 4.2 MMOL/L — SIGNIFICANT CHANGE UP (ref 3.5–5.3)
POTASSIUM SERPL-SCNC: 4.2 MMOL/L — SIGNIFICANT CHANGE UP (ref 3.5–5.3)
PROT SERPL-MCNC: 7.5 G/DL — SIGNIFICANT CHANGE UP (ref 6–8.3)
PROT UR-MCNC: 30 MG/DL
RBC # BLD: 4.3 M/UL — SIGNIFICANT CHANGE UP (ref 3.8–5.2)
RBC # FLD: 13.1 % — SIGNIFICANT CHANGE UP (ref 10.3–14.5)
SODIUM SERPL-SCNC: 141 MMOL/L — SIGNIFICANT CHANGE UP (ref 135–145)
SP GR SPEC: 1.02 — SIGNIFICANT CHANGE UP (ref 1.01–1.02)
UROBILINOGEN FLD QL: NEGATIVE — SIGNIFICANT CHANGE UP
WBC # BLD: 12.78 K/UL — HIGH (ref 3.8–10.5)
WBC # FLD AUTO: 12.78 K/UL — HIGH (ref 3.8–10.5)

## 2021-12-01 PROCEDURE — 74018 RADEX ABDOMEN 1 VIEW: CPT | Mod: 26

## 2021-12-01 PROCEDURE — 81025 URINE PREGNANCY TEST: CPT

## 2021-12-01 PROCEDURE — 87491 CHLMYD TRACH DNA AMP PROBE: CPT

## 2021-12-01 PROCEDURE — 87591 N.GONORRHOEAE DNA AMP PROB: CPT

## 2021-12-01 PROCEDURE — 85025 COMPLETE CBC W/AUTO DIFF WBC: CPT

## 2021-12-01 PROCEDURE — 87186 SC STD MICRODIL/AGAR DIL: CPT

## 2021-12-01 PROCEDURE — 99284 EMERGENCY DEPT VISIT MOD MDM: CPT | Mod: 25

## 2021-12-01 PROCEDURE — 81001 URINALYSIS AUTO W/SCOPE: CPT

## 2021-12-01 PROCEDURE — 74018 RADEX ABDOMEN 1 VIEW: CPT

## 2021-12-01 PROCEDURE — 99285 EMERGENCY DEPT VISIT HI MDM: CPT

## 2021-12-01 PROCEDURE — 76775 US EXAM ABDO BACK WALL LIM: CPT | Mod: 26

## 2021-12-01 PROCEDURE — 96374 THER/PROPH/DIAG INJ IV PUSH: CPT

## 2021-12-01 PROCEDURE — 80053 COMPREHEN METABOLIC PANEL: CPT

## 2021-12-01 PROCEDURE — 87077 CULTURE AEROBIC IDENTIFY: CPT

## 2021-12-01 PROCEDURE — 83690 ASSAY OF LIPASE: CPT

## 2021-12-01 PROCEDURE — 36415 COLL VENOUS BLD VENIPUNCTURE: CPT

## 2021-12-01 PROCEDURE — 87086 URINE CULTURE/COLONY COUNT: CPT

## 2021-12-01 PROCEDURE — 87389 HIV-1 AG W/HIV-1&-2 AB AG IA: CPT

## 2021-12-01 PROCEDURE — 76775 US EXAM ABDO BACK WALL LIM: CPT

## 2021-12-01 RX ORDER — SODIUM CHLORIDE 9 MG/ML
1000 INJECTION INTRAMUSCULAR; INTRAVENOUS; SUBCUTANEOUS ONCE
Refills: 0 | Status: COMPLETED | OUTPATIENT
Start: 2021-12-01 | End: 2021-12-01

## 2021-12-01 RX ORDER — CEFPODOXIME PROXETIL 100 MG
1 TABLET ORAL
Qty: 20 | Refills: 0
Start: 2021-12-01 | End: 2021-12-10

## 2021-12-01 RX ORDER — CEFTRIAXONE 500 MG/1
1000 INJECTION, POWDER, FOR SOLUTION INTRAMUSCULAR; INTRAVENOUS ONCE
Refills: 0 | Status: COMPLETED | OUTPATIENT
Start: 2021-12-01 | End: 2021-12-01

## 2021-12-01 RX ADMIN — CEFTRIAXONE 100 MILLIGRAM(S): 500 INJECTION, POWDER, FOR SOLUTION INTRAMUSCULAR; INTRAVENOUS at 13:15

## 2021-12-01 RX ADMIN — SODIUM CHLORIDE 1000 MILLILITER(S): 9 INJECTION INTRAMUSCULAR; INTRAVENOUS; SUBCUTANEOUS at 11:42

## 2021-12-01 NOTE — ED PROVIDER NOTE - CLINICAL SUMMARY MEDICAL DECISION MAKING FREE TEXT BOX
pt with b/l flank pain pt with b/l flank pain, UA shows infection possibly very early pyelo given b/l mild flank pain but no fever or chills, no WBC elevation, pain is not colicky so unlikely kidney stone

## 2021-12-01 NOTE — ED PROVIDER NOTE - OBJECTIVE STATEMENT
c/o b/l intermittent, sharp, flank pain severe at times, today with 1 episode of hematuria, no fever, no dysuria, no abdominal pain. pt with h/o renal colic. no other medical problems.

## 2021-12-01 NOTE — ED PROVIDER NOTE - PATIENT PORTAL LINK FT
You can access the FollowMyHealth Patient Portal offered by [facility] by registering at the following website: [email or web address]. By joining [employer]’s FollowMyHealth portal, you will also be able to view your health information using other applications (apps) compatible with our system.

## 2021-12-01 NOTE — ED ADULT NURSE NOTE - OBJECTIVE STATEMENT
C/O kidney/flank pain bilateral x 3 days.  pt reports blood in urine but denies any other symptoms.  Pt hx of kidney stones.  Denies fevers, n/v.  Pt also reports waking up with a cold sore on her lip. Pt to be sent to CT.

## 2021-12-01 NOTE — ED PROVIDER NOTE - NSFOLLOWUPINSTRUCTIONS_ED_ALL_ED_FT
-- Follow up with your primary care physician in 48 hours.    -- Return to the ER for worsening or persistent symptoms, and/or ANY NEW OR CONCERNING SYMPTOMS.    -- If you have difficulty following up, please call: [phone] to obtain a [facility] doctor or specialist who takes your insurance in your area.

## 2021-12-01 NOTE — ED ADULT NURSE NOTE - NSICDXPASTSURGICALHX_GEN_ALL_CORE_FT
Establishing with Dr Contreras 1/25/18  After reviewing the patients chart, refilled per provider's standing order.     PAST SURGICAL HISTORY:  No significant past surgical history

## 2021-12-02 LAB
C TRACH RRNA SPEC QL NAA+PROBE: SIGNIFICANT CHANGE UP
N GONORRHOEA RRNA SPEC QL NAA+PROBE: SIGNIFICANT CHANGE UP
SPECIMEN SOURCE: SIGNIFICANT CHANGE UP

## 2022-01-09 ENCOUNTER — EMERGENCY (EMERGENCY)
Facility: HOSPITAL | Age: 37
LOS: 1 days | Discharge: HOME | End: 2022-01-09
Attending: EMERGENCY MEDICINE | Admitting: EMERGENCY MEDICINE
Payer: MEDICAID

## 2022-01-09 VITALS
SYSTOLIC BLOOD PRESSURE: 134 MMHG | WEIGHT: 134.92 LBS | TEMPERATURE: 99 F | DIASTOLIC BLOOD PRESSURE: 84 MMHG | RESPIRATION RATE: 14 BRPM | HEIGHT: 62 IN | OXYGEN SATURATION: 100 % | HEART RATE: 91 BPM

## 2022-01-09 VITALS
OXYGEN SATURATION: 100 % | RESPIRATION RATE: 18 BRPM | DIASTOLIC BLOOD PRESSURE: 67 MMHG | HEART RATE: 80 BPM | TEMPERATURE: 98 F | SYSTOLIC BLOOD PRESSURE: 116 MMHG

## 2022-01-09 LAB
ALBUMIN SERPL ELPH-MCNC: 3.5 G/DL — SIGNIFICANT CHANGE UP (ref 3.3–5)
ALP SERPL-CCNC: 60 U/L — SIGNIFICANT CHANGE UP (ref 40–120)
ALT FLD-CCNC: 22 U/L — SIGNIFICANT CHANGE UP (ref 12–78)
ANION GAP SERPL CALC-SCNC: 7 MMOL/L — SIGNIFICANT CHANGE UP (ref 5–17)
APPEARANCE UR: CLEAR — SIGNIFICANT CHANGE UP
AST SERPL-CCNC: 11 U/L — LOW (ref 15–37)
BASOPHILS # BLD AUTO: 0.04 K/UL — SIGNIFICANT CHANGE UP (ref 0–0.2)
BASOPHILS NFR BLD AUTO: 0.3 % — SIGNIFICANT CHANGE UP (ref 0–2)
BILIRUB SERPL-MCNC: 0.1 MG/DL — LOW (ref 0.2–1.2)
BILIRUB UR-MCNC: NEGATIVE — SIGNIFICANT CHANGE UP
BUN SERPL-MCNC: 13 MG/DL — SIGNIFICANT CHANGE UP (ref 7–23)
CALCIUM SERPL-MCNC: 9.1 MG/DL — SIGNIFICANT CHANGE UP (ref 8.5–10.1)
CHLORIDE SERPL-SCNC: 109 MMOL/L — HIGH (ref 96–108)
CO2 SERPL-SCNC: 22 MMOL/L — SIGNIFICANT CHANGE UP (ref 22–31)
COLOR SPEC: YELLOW — SIGNIFICANT CHANGE UP
CREAT SERPL-MCNC: 0.99 MG/DL — SIGNIFICANT CHANGE UP (ref 0.5–1.3)
DIFF PNL FLD: ABNORMAL
EOSINOPHIL # BLD AUTO: 0.18 K/UL — SIGNIFICANT CHANGE UP (ref 0–0.5)
EOSINOPHIL NFR BLD AUTO: 1.4 % — SIGNIFICANT CHANGE UP (ref 0–6)
GLUCOSE SERPL-MCNC: 86 MG/DL — SIGNIFICANT CHANGE UP (ref 70–99)
GLUCOSE UR QL: NEGATIVE — SIGNIFICANT CHANGE UP
HCG SERPL-ACNC: <1 MIU/ML — SIGNIFICANT CHANGE UP
HCT VFR BLD CALC: 37.7 % — SIGNIFICANT CHANGE UP (ref 34.5–45)
HGB BLD-MCNC: 13 G/DL — SIGNIFICANT CHANGE UP (ref 11.5–15.5)
IMM GRANULOCYTES NFR BLD AUTO: 0.5 % — SIGNIFICANT CHANGE UP (ref 0–1.5)
KETONES UR-MCNC: NEGATIVE — SIGNIFICANT CHANGE UP
LEUKOCYTE ESTERASE UR-ACNC: ABNORMAL
LIDOCAIN IGE QN: 163 U/L — SIGNIFICANT CHANGE UP (ref 73–393)
LYMPHOCYTES # BLD AUTO: 24.6 % — SIGNIFICANT CHANGE UP (ref 13–44)
LYMPHOCYTES # BLD AUTO: 3.14 K/UL — SIGNIFICANT CHANGE UP (ref 1–3.3)
MCHC RBC-ENTMCNC: 29.1 PG — SIGNIFICANT CHANGE UP (ref 27–34)
MCHC RBC-ENTMCNC: 34.5 GM/DL — SIGNIFICANT CHANGE UP (ref 32–36)
MCV RBC AUTO: 84.5 FL — SIGNIFICANT CHANGE UP (ref 80–100)
MONOCYTES # BLD AUTO: 0.88 K/UL — SIGNIFICANT CHANGE UP (ref 0–0.9)
MONOCYTES NFR BLD AUTO: 6.9 % — SIGNIFICANT CHANGE UP (ref 2–14)
NEUTROPHILS # BLD AUTO: 8.45 K/UL — HIGH (ref 1.8–7.4)
NEUTROPHILS NFR BLD AUTO: 66.3 % — SIGNIFICANT CHANGE UP (ref 43–77)
NITRITE UR-MCNC: NEGATIVE — SIGNIFICANT CHANGE UP
NRBC # BLD: 0 /100 WBCS — SIGNIFICANT CHANGE UP (ref 0–0)
PH UR: 6 — SIGNIFICANT CHANGE UP (ref 5–8)
PLATELET # BLD AUTO: 249 K/UL — SIGNIFICANT CHANGE UP (ref 150–400)
POTASSIUM SERPL-MCNC: 3.7 MMOL/L — SIGNIFICANT CHANGE UP (ref 3.5–5.3)
POTASSIUM SERPL-SCNC: 3.7 MMOL/L — SIGNIFICANT CHANGE UP (ref 3.5–5.3)
PROT SERPL-MCNC: 7.8 G/DL — SIGNIFICANT CHANGE UP (ref 6–8.3)
PROT UR-MCNC: NEGATIVE — SIGNIFICANT CHANGE UP
RBC # BLD: 4.46 M/UL — SIGNIFICANT CHANGE UP (ref 3.8–5.2)
RBC # FLD: 13.6 % — SIGNIFICANT CHANGE UP (ref 10.3–14.5)
SARS-COV-2 RNA SPEC QL NAA+PROBE: SIGNIFICANT CHANGE UP
SODIUM SERPL-SCNC: 138 MMOL/L — SIGNIFICANT CHANGE UP (ref 135–145)
SP GR SPEC: 1.01 — SIGNIFICANT CHANGE UP (ref 1.01–1.02)
UROBILINOGEN FLD QL: NEGATIVE — SIGNIFICANT CHANGE UP
WBC # BLD: 12.76 K/UL — HIGH (ref 3.8–10.5)
WBC # FLD AUTO: 12.76 K/UL — HIGH (ref 3.8–10.5)

## 2022-01-09 PROCEDURE — 81001 URINALYSIS AUTO W/SCOPE: CPT

## 2022-01-09 PROCEDURE — G1004: CPT

## 2022-01-09 PROCEDURE — 74176 CT ABD & PELVIS W/O CONTRAST: CPT | Mod: 26,ME

## 2022-01-09 PROCEDURE — 70450 CT HEAD/BRAIN W/O DYE: CPT | Mod: MA

## 2022-01-09 PROCEDURE — 87635 SARS-COV-2 COVID-19 AMP PRB: CPT

## 2022-01-09 PROCEDURE — 85025 COMPLETE CBC W/AUTO DIFF WBC: CPT

## 2022-01-09 PROCEDURE — 99285 EMERGENCY DEPT VISIT HI MDM: CPT

## 2022-01-09 PROCEDURE — 74176 CT ABD & PELVIS W/O CONTRAST: CPT | Mod: ME

## 2022-01-09 PROCEDURE — 99283 EMERGENCY DEPT VISIT LOW MDM: CPT | Mod: 25

## 2022-01-09 PROCEDURE — 36415 COLL VENOUS BLD VENIPUNCTURE: CPT

## 2022-01-09 PROCEDURE — 87086 URINE CULTURE/COLONY COUNT: CPT

## 2022-01-09 PROCEDURE — 80053 COMPREHEN METABOLIC PANEL: CPT

## 2022-01-09 PROCEDURE — 83690 ASSAY OF LIPASE: CPT

## 2022-01-09 PROCEDURE — 96360 HYDRATION IV INFUSION INIT: CPT

## 2022-01-09 PROCEDURE — 84702 CHORIONIC GONADOTROPIN TEST: CPT

## 2022-01-09 PROCEDURE — 70450 CT HEAD/BRAIN W/O DYE: CPT | Mod: 26,MA

## 2022-01-09 RX ORDER — ACETAMINOPHEN 500 MG
650 TABLET ORAL ONCE
Refills: 0 | Status: COMPLETED | OUTPATIENT
Start: 2022-01-09 | End: 2022-01-09

## 2022-01-09 RX ORDER — SODIUM CHLORIDE 9 MG/ML
1000 INJECTION INTRAMUSCULAR; INTRAVENOUS; SUBCUTANEOUS ONCE
Refills: 0 | Status: COMPLETED | OUTPATIENT
Start: 2022-01-09 | End: 2022-01-09

## 2022-01-09 RX ADMIN — Medication 650 MILLIGRAM(S): at 21:08

## 2022-01-09 RX ADMIN — SODIUM CHLORIDE 1000 MILLILITER(S): 9 INJECTION INTRAMUSCULAR; INTRAVENOUS; SUBCUTANEOUS at 22:08

## 2022-01-09 RX ADMIN — SODIUM CHLORIDE 1000 MILLILITER(S): 9 INJECTION INTRAMUSCULAR; INTRAVENOUS; SUBCUTANEOUS at 21:08

## 2022-01-09 RX ADMIN — Medication 650 MILLIGRAM(S): at 21:38

## 2022-01-09 NOTE — ED PROVIDER NOTE - CARE PROVIDER_API CALL
Yariel Bee)  Urology  [address], [address]  [place] [address]  Phone: [phone]  Fax: [phone]  Follow Up Time:

## 2022-01-09 NOTE — ED PROVIDER NOTE - OBJECTIVE STATEMENT
36 F hx kidney stones c/o hematuria, flank pain x 6 days. Also states has been having daily headaches for for week, denies hx, not relieved with meds. Went to [facility] (Dr. Donahue) 3 days ago, given bactrim. Was told she needs a CT of abd/pelvis and CT head.     No covid vaccine.

## 2022-01-09 NOTE — ED ADULT TRIAGE NOTE - CHIEF COMPLAINT QUOTE
lower abd pain, lower back pain with urinary symptoms and blood in urine x3 days. Pt concerned about nose bleed when she blows her nose. hx kidney stones.

## 2022-01-09 NOTE — ED PROVIDER NOTE - CLINICAL SUMMARY MEDICAL DECISION MAKING FREE TEXT BOX
36 F hx kidney stones c/o hematuria, flank pain x 6 days. Also states has been having daily headaches for for week, denies hx, not relieved with meds. Went to [facility] (Dr. Donahue) 3 days ago, given bactrim. Was told she needs a CT of abd/pelvis and CT head. - nonfocal neuro, nad, lcvat - ivf, meds, labs, ct

## 2022-01-09 NOTE — ED PROVIDER NOTE - ATTENDING CONTRIBUTION TO CARE
37yo female with hematuria, hx of kidney stones, no vomiting or diarrhea, no fever, +chills, pt has also had headaches and was told by her pmd to have it evaluated  exam:+distended non tender no rebound or guarding  plan: labs, CT head/abd  agree with assessment and plan of PA

## 2022-01-09 NOTE — ED ADULT NURSE NOTE - OBJECTIVE STATEMENT
Patient A/Ox4 with a steady gait. C/o headaches and dizziness without blurry vision or photophobia. Also c/o BL flank pain with hematuria but denies dysuria. Has hx of kidney stones. Currently on Bactrim for ?UTI. Urine cup at bedside, awaiting sample. Call light in reach.

## 2022-01-09 NOTE — ED ADULT NURSE NOTE - NSIMPLEMENTINTERV_GEN_ALL_ED
Implemented All Universal Safety Interventions:  [place] to call system. Call bell, personal items and telephone within reach. Instruct patient to call for assistance. Room bathroom lighting operational. Non-slip footwear when patient is off stretcher. Physically safe environment: no spills, clutter or unnecessary equipment. Stretcher in lowest position, wheels locked, appropriate side rails in place.

## 2022-01-09 NOTE — ED PROVIDER NOTE - NSFOLLOWUPINSTRUCTIONS_ED_ALL_ED_FT
Drink plenty of water.  Tylenol/ibuprofen for pain.  Finish course of antibiotics that were prescribed to you.  Follow up with urologist.  Return to the Emergency Department for worsening or concerning symptoms.    -----------------------------------------------------------      Urinary Tract Infection in Women    WHAT YOU NEED TO KNOW:    A urinary tract infection (UTI) is caused by bacteria that get inside your urinary tract. Most bacteria that enter your urinary tract come out when you urinate. If the bacteria stay in your urinary tract, you may get an infection. Your urinary tract includes your kidneys, ureters, bladder, and urethra. Urine is made in your kidneys, and it flows from the ureters to the bladder. Urine leaves the bladder through the urethra. A UTI is more common in your lower urinary tract, which includes your bladder and urethra.     Female Urinary System         DISCHARGE INSTRUCTIONS:    Return to the emergency department if:   •You are urinating very little or not at all.      •You have a high fever with shaking chills.       •You have side or back pain that gets worse.      Call your doctor if:   •You have a fever.      •You do not feel better after 2 days of taking antibiotics.      •You are vomiting.       •You have questions or concerns about your condition or care.      Medicines:   •Antibiotics help fight a bacterial infection. If you have UTIs often (called recurrent UTIs), you may be given antibiotics to take regularly. You will be given directions for when and how to use antibiotics. The goal is to prevent UTIs but not cause antibiotic resistance by using antibiotics too often.      •Medicines may be given to decrease pain and burning when you urinate. They will also help decrease the feeling that you need to urinate often. These medicines will make your urine orange or red.      •Take your medicine as directed. Contact your healthcare provider if you think your medicine is not helping or if you have side effects. Tell him or her if you are allergic to any medicine. Keep a list of the medicines, vitamins, and herbs you take. Include the amounts, and when and why you take them. Bring the list or the pill bottles to follow-up visits. Carry your medicine list with you in case of an emergency.      Follow up with your doctor as directed: Write down your questions so you remember to ask them during your visits.     Prevent another UTI:   •Empty your bladder often. Urinate and empty your bladder as soon as you feel the need. Do not hold your urine for long periods of time.      •Wipe from front to back after you urinate or have a bowel movement. This will help prevent germs from getting into your urinary tract through your urethra.      •Drink liquids as directed. Ask how much liquid to drink each day and which liquids are best for you. You may need to drink more liquids than usual to help flush out the bacteria. Do not drink alcohol, caffeine, or citrus juices. These can irritate your bladder and increase your symptoms. Your healthcare provider may recommend cranberry juice to help prevent a UTI.      •Urinate after you have sex. This can help flush out bacteria passed during sex.      •Do not douche or use feminine deodorants. These can change the chemical balance in your vagina.      •Change sanitary pads or tampons often. This will help prevent germs from getting into your urinary tract.       •Talk to your healthcare provider about your birth control method. You may need to change your method if it is increasing your risk for UTIs.      •Wear cotton underwear and clothes that are loose. Tight pants and nylon underwear can trap moisture and cause bacteria to grow.      •Vaginal estrogen may be recommended. This medicine helps prevent UTIs in women who have gone through menopause or are in manuel-menopause.      •Do pelvic muscle exercises often. Pelvic muscle exercises may help you start and stop urinating. Strong pelvic muscles may help you empty your bladder easier. Squeeze these muscles tightly for 5 seconds like you are trying to hold back urine. Then relax for 5 seconds. Gradually work up to squeezing for 10 seconds. Do 3 sets of 15 repetitions a day, or as directed.

## 2022-01-09 NOTE — ED PROVIDER NOTE - PATIENT PORTAL LINK FT
You can access the FollowMyHealth Patient Portal offered by [facility] by registering at the following website: [email or web address]. By joining [employer]’s FollowMyHealth portal, you will also be able to view your health information using other applications (apps) compatible with our system.

## 2022-01-10 LAB
CULTURE RESULTS: NO GROWTH — SIGNIFICANT CHANGE UP
SPECIMEN SOURCE: SIGNIFICANT CHANGE UP

## 2023-02-06 NOTE — ED ADULT NURSE NOTE - URINE CHARACTERISTICS
No. WICHO screening performed.  STOP BANG Legend: 0-2 = LOW Risk; 3-4 = INTERMEDIATE Risk; 5-8 = HIGH Risk
clear

## 2023-02-28 PROBLEM — N20.0 CALCULUS OF KIDNEY: Chronic | Status: ACTIVE | Noted: 2022-01-09

## 2023-03-06 ENCOUNTER — APPOINTMENT (OUTPATIENT)
Dept: MATERNAL FETAL MEDICINE | Facility: CLINIC | Age: 38
End: 2023-03-06
Payer: MEDICARE

## 2023-03-06 ENCOUNTER — ASOB RESULT (OUTPATIENT)
Age: 38
End: 2023-03-06

## 2023-03-06 PROCEDURE — 99202 OFFICE O/P NEW SF 15 MIN: CPT | Mod: 95

## 2023-03-23 ENCOUNTER — APPOINTMENT (OUTPATIENT)
Dept: MATERNAL FETAL MEDICINE | Facility: CLINIC | Age: 38
End: 2023-03-23
Payer: MEDICAID

## 2023-03-23 ENCOUNTER — APPOINTMENT (OUTPATIENT)
Dept: ANTEPARTUM | Facility: CLINIC | Age: 38
End: 2023-03-23
Payer: MEDICAID

## 2023-03-23 ENCOUNTER — ASOB RESULT (OUTPATIENT)
Age: 38
End: 2023-03-23

## 2023-03-23 VITALS
HEIGHT: 62 IN | HEART RATE: 103 BPM | DIASTOLIC BLOOD PRESSURE: 76 MMHG | SYSTOLIC BLOOD PRESSURE: 120 MMHG | BODY MASS INDEX: 26.68 KG/M2 | WEIGHT: 145 LBS | OXYGEN SATURATION: 100 %

## 2023-03-23 DIAGNOSIS — O09.899 SUPERVISION OF OTHER HIGH RISK PREGNANCIES, UNSPECIFIED TRIMESTER: ICD-10-CM

## 2023-03-23 DIAGNOSIS — O35.9XX0 MATERNAL CARE FOR (SUSPECTED) FETAL ABNORMALITY AND DAMAGE, UNSPECIFIED, NOT APPLICABLE OR UNSPECIFIED: ICD-10-CM

## 2023-03-23 DIAGNOSIS — O09.521 SUPERVISION OF ELDERLY MULTIGRAVIDA, FIRST TRIMESTER: ICD-10-CM

## 2023-03-23 DIAGNOSIS — Z87.442 PERSONAL HISTORY OF URINARY CALCULI: ICD-10-CM

## 2023-03-23 DIAGNOSIS — Z3A.10 10 WEEKS GESTATION OF PREGNANCY: ICD-10-CM

## 2023-03-23 DIAGNOSIS — Z87.51 PERSONAL HISTORY OF PRE-TERM LABOR: ICD-10-CM

## 2023-03-23 DIAGNOSIS — Z3A.12 12 WEEKS GESTATION OF PREGNANCY: ICD-10-CM

## 2023-03-23 PROCEDURE — 76813 OB US NUCHAL MEAS 1 GEST: CPT

## 2023-03-23 PROCEDURE — 99214 OFFICE O/P EST MOD 30 MIN: CPT

## 2023-03-23 PROCEDURE — 36415 COLL VENOUS BLD VENIPUNCTURE: CPT

## 2023-03-23 PROCEDURE — ZZZZZ: CPT

## 2023-03-23 RX ORDER — PRENATAL WITH FERROUS FUM AND FOLIC ACID 3080; 920; 120; 400; 22; 1.84; 3; 20; 10; 1; 12; 200; 27; 25; 2 [IU]/1; [IU]/1; MG/1; [IU]/1; MG/1; MG/1; MG/1; MG/1; MG/1; MG/1; UG/1; MG/1; MG/1; MG/1; MG/1
27-1 TABLET ORAL DAILY
Qty: 90 | Refills: 3 | Status: ACTIVE | COMMUNITY
Start: 2023-03-23

## 2023-03-23 NOTE — DISCUSSION/SUMMARY
[FreeTextEntry1] : The patient is a 37-year-old -1-0-2 being seen today at approximately 12 weeks for advanced maternal age and previous history of premature rupture of membranes with  delivery.\par \par Her obstetrical history is significant for delivery in  of a liveborn male  weighing 4 pounds 8 ounces delivered between 34 and 35 weeks by normal spontaneous vaginal delivery.  The patient states she had premature rupture of membranes with spontaneous onset of labor.  She states she had normal routine prenatal care and no risk factors have been identified.  Subsequent pregnancy in  a liveborn female  weighing 5+ pounds delivered at 40 weeks also by normal spontaneous vaginal delivery.  No problems or complication with that pregnancy.\par \par Ultra-Screen evaluation was performed today and reveals a single viable intrauterine gestation with size consistent with dates.  No abnormalities noted and the nuchal translucency was found to be within normal limits.  Vital signs today revealed blood pressure 120/76 and maternal weight is 145 pounds consistent with a BMI of 26.52 kg.\par \par Advanced maternal age;\par \par Genetic counseling has been performed and report was sent under separate cover.  Ultra-Screen blood work and NIPS blood test were performed today.  Sequential blood work between 16 and 18 weeks is recommended.  Diagnostic testing will be offered if clinically indicated.\par \par  delivery with premature rupture membranes;\par \par The patient's obstetrical history is significant for  premature rupture membranes with  delivery.  It is interesting to note that the patient's second pregnancy was full-term with a  weighing 5 and half pounds while her previous delivery at 34 to 35 weeks  weight was 4 and half pounds.  I question whether or not the first delivery was .  The patient had no contributing factors that would cause  premature rupture of membranes.  Possible etiologies including trauma, infection and cervical shortening or insufficiency were discussed.  Her subsequent pregnancy was full-term and therefore the likely recurrence is low.  Signs and symptoms of cervical insufficiency were discussed.  A comprehensive ultrasound at 20 weeks is be scheduled.  In addition a growth scan with cervical length evaluation at 28 weeks will be recommended.\par \par The father of this pregnancy is a new father.  The patient is at increased risk for gestational hypertension and/or preeclampsia.  Her blood pressure today was 120/76.  She was advised to start low-dose aspirin alternating 1 tablet and 2 tablets which she should take until 38 weeks.  Low-dose aspirin has demonstrated statistically significant prevention and/or delay in onset and severity of preeclampsia should it occur.  Preeclampsia was discussed and signs and symptoms should be reinforced later in pregnancy.\par \par COVID-19 vaccination;\par \par COVID-19 vaccination in pregnancy was discussed.  Risks and benefits of vaccination were discussed and after all the patient's questions were answered the patient has declined vaccination during pregnancy.  She understands she has certain comorbidities that put her at increased risk for problems and/or complications should she have a COVID infection.  She will contact your office to discuss treatment options if she has a COVID infection.\par \par Her mother has diabetes and breast cancer and her father has hypertension.  She has a history of kidney stones.  She has no previous surgical history.  She has no known allergies to medications and denies alcohol, tobacco or drug use.\par \par I spent a total of 36 minutes reviewing the patient's prenatal record, prenatal blood work, outside medical records, previous consultations and ultrasound reports counseling and coordinating care.\par \par Recommendations;\par \par 1.  Sequential blood work between 16 and 18 weeks.\par 2.  Comprehensive ultrasound at 20 weeks.\par 3.  Growth scan at 28 weeks with cervical length evaluation.\par 4.  Low-dose aspirin alternating 1 tablet and 2 tablets until 38 weeks.\par 5.  3-hour glucose tolerance test between 24 and 28 weeks.\par 6.  Follow-up maternal-fetal medicine consultation as clinically indicated.

## 2023-03-27 LAB
ADDITIONAL US: NORMAL
COMMENTS: AFP: NORMAL
CRL SCAN TWIN B: NORMAL
CRL SCAN: NORMAL
CROWN RUMP LENGTH TWIN B: NORMAL
CROWN RUMP LENGTH: 63.8 MM
DOWN SYNDROME AGE RISK: NORMAL
DOWN SYNDROME INTERPRETATION: NORMAL
DOWN SYNDROME SCREENING RISK: NORMAL
GEST. AGE ON COLLECTION DATE: 12.6 WEEKS
HCG MOM: 1.98
HCG VALUE: 196 IU/ML
MATERNAL AGE AT EDD: 35.8 YR
NOTE: AFP: NORMAL
NT MOM TWIN B: NORMAL
NT TWIN B: NORMAL
NUCHAL TRANSLUCENCY (NT): 1.6 MM
NUCHAL TRANSLUCENCY MOM: 0.97
NUMBER OF FETUSES: 1
PAPP-A MOM: 1.4
PAPP-A VALUE: 1529.4 NG/ML
RACE: NORMAL
RESULTS AFP: NORMAL
SONOGRAPHER ID#: NORMAL
SUBMIT PART 2 SAMPLE USING: NORMAL
TEST RESULTS: AFP: NORMAL
TRISOMY 18 AGE RISK: NORMAL
TRISOMY 18 INTERPRETATION: NORMAL
TRISOMY 18 SCREENING RISK: NORMAL
WEIGHT AFP: 145 LBS

## 2023-04-03 ENCOUNTER — NON-APPOINTMENT (OUTPATIENT)
Age: 38
End: 2023-04-03

## 2023-05-01 ENCOUNTER — APPOINTMENT (OUTPATIENT)
Dept: ANTEPARTUM | Facility: CLINIC | Age: 38
End: 2023-05-01
Payer: MEDICAID

## 2023-05-01 ENCOUNTER — ASOB RESULT (OUTPATIENT)
Age: 38
End: 2023-05-01

## 2023-05-01 PROCEDURE — 76817 TRANSVAGINAL US OBSTETRIC: CPT

## 2023-05-01 PROCEDURE — 76811 OB US DETAILED SNGL FETUS: CPT

## 2023-05-01 PROCEDURE — 36415 COLL VENOUS BLD VENIPUNCTURE: CPT

## 2023-05-04 LAB
ADDITIONAL US: NORMAL
AFP MOM: 2.01
AFP VALUE: 89.3 NG/ML
COLLECTED ON 2: NORMAL
COLLECTED ON: NORMAL
CRL SCAN TWIN B: NORMAL
CRL SCAN: NORMAL
CROWN RUMP LENGTH TWIN B: NORMAL
CROWN RUMP LENGTH: 63.8 MM
DIA MOM: 1.53
DIA VALUE: 242 PG/ML
DOWN SYNDROME AGE RISK: NORMAL
DOWN SYNDROME INTERPRETATION: NORMAL
DOWN SYNDROME SCREENING RISK: NORMAL
FIRST TRIMESTER SAMPLE: NORMAL
GEST. AGE ON COLLECTION DATE: 12.6 WEEKS
GESTATIONAL AGE: 18.1 WEEKS
HCG MOM: 2.4
HCG VALUE: 63.9 IU/ML
INSULIN DEP DIABETES: NO
MATERNAL AGE AT EDD: 35.8 YR
NT MOM TWIN B: NORMAL
NT TWIN B: NORMAL
NUCHAL TRANSLUCENCY (NT): 1.6 MM
NUCHAL TRANSLUCENCY MOM: 0.97
NUMBER OF FETUSES: 1
OPEN SPINA BIFIDA: NORMAL
OSB INTERPRETATION: NORMAL
PAPP-A MOM: 1.4
PAPP-A VALUE: 1529.4 NG/ML
RACE: NORMAL
SECOND TRIMESTER SAMPLE: NORMAL
SEQUENTIAL 2 COMMENTS: NORMAL
SEQUENTIAL 2 NOTE: NORMAL
SEQUENTIAL 2 RESULTS: NORMAL
SEQUENTIAL 2 TEST RESULTS: NORMAL
SONOGRAPHER ID#: NORMAL
TRISOMY 18 AGE RISK: NORMAL
TRISOMY 18 INTERPRETATION: NORMAL
TRISOMY 18 SCREENING RISK: NORMAL
UE3 MOM: 0.75
UE3 VALUE: 1.1 NG/ML
WEIGHT AFP: 145 LBS
WEIGHT: 144 LBS

## 2023-05-17 ENCOUNTER — APPOINTMENT (OUTPATIENT)
Dept: ANTEPARTUM | Facility: CLINIC | Age: 38
End: 2023-05-17

## 2023-05-31 ENCOUNTER — APPOINTMENT (OUTPATIENT)
Dept: ANTEPARTUM | Facility: CLINIC | Age: 38
End: 2023-05-31
Payer: MEDICAID

## 2023-05-31 ENCOUNTER — ASOB RESULT (OUTPATIENT)
Age: 38
End: 2023-05-31

## 2023-05-31 PROCEDURE — 76816 OB US FOLLOW-UP PER FETUS: CPT

## 2023-07-11 ENCOUNTER — APPOINTMENT (OUTPATIENT)
Dept: ANTEPARTUM | Facility: CLINIC | Age: 38
End: 2023-07-11

## 2023-07-20 ENCOUNTER — ASOB RESULT (OUTPATIENT)
Age: 38
End: 2023-07-20

## 2023-07-20 ENCOUNTER — APPOINTMENT (OUTPATIENT)
Dept: ANTEPARTUM | Facility: CLINIC | Age: 38
End: 2023-07-20
Payer: MEDICAID

## 2023-07-20 PROCEDURE — 76816 OB US FOLLOW-UP PER FETUS: CPT

## 2023-08-24 ENCOUNTER — APPOINTMENT (OUTPATIENT)
Dept: ANTEPARTUM | Facility: CLINIC | Age: 38
End: 2023-08-24

## 2023-12-18 ENCOUNTER — APPOINTMENT (OUTPATIENT)
Dept: OBGYN | Facility: CLINIC | Age: 38
End: 2023-12-18
Payer: MEDICAID

## 2023-12-18 PROCEDURE — 99203 OFFICE O/P NEW LOW 30 MIN: CPT

## 2023-12-19 NOTE — HISTORY OF PRESENT ILLNESS
[FreeTextEntry1] : 38 yr old presents for consult  Referred by [facility] Prev  x3 No med hx no surgery, no abd surgery

## 2023-12-19 NOTE — COUNSELING
[Nutrition/ Exercise/ Weight Management] : nutrition, exercise, weight management [FreeTextEntry2] : pre op/post op

## 2023-12-19 NOTE — PLAN
[FreeTextEntry1] : 38 yr old presents for consult for elective BLS not currently on birthcontrol. Encouraged condom use Discussed pre op and post op instructions in detail. Transvaginal BLS Discussed Vaginal restrictions only for 2 weeks. Will send consult note back to [facility] all of patients questions regarding procedure were answered.    I Yoly Crowell  am scribing for the presence of Dr. Machado the following sections HISTORY OF PRESENT ILLNESS, PAST MEDICAL/FAMILY/SOCIAL HISTORY; REVIEW OF SYSTEMS; VITAL SIGNS; PHYSICAL EXAM; DISPOSITION.    I personally performed the services described in the documentation, reviewed the documentation recorded by the scribe in my presence and it accurately and completely records my words and actions.

## 2023-12-19 NOTE — PHYSICAL EXAM
[Chaperone Present] : A chaperone was present in the examining room during all aspects of the physical examination [Appropriately responsive] : appropriately responsive [Alert] : alert [No Acute Distress] : no acute distress [No Lymphadenopathy] : no lymphadenopathy [Soft] : soft [Non-tender] : non-tender [Non-distended] : non-distended [No HSM] : No HSM [No Lesions] : no lesions [No Mass] : no mass [Oriented x3] : oriented x3 [Labia Majora] : normal [Normal] : normal [Retroversion] : retroverted [FreeTextEntry1] : Yoly Crowell  [FreeTextEntry6] : 6 wk size uterus [FreeTextEntry9] : deferred

## 2024-01-24 ENCOUNTER — APPOINTMENT (OUTPATIENT)
Dept: ANTEPARTUM | Facility: CLINIC | Age: 39
End: 2024-01-24

## 2024-01-24 ENCOUNTER — APPOINTMENT (OUTPATIENT)
Dept: OBGYN | Facility: CLINIC | Age: 39
End: 2024-01-24

## 2024-01-25 ENCOUNTER — APPOINTMENT (OUTPATIENT)
Dept: OBGYN | Facility: HOSPITAL | Age: 39
End: 2024-01-25